# Patient Record
Sex: FEMALE | Race: WHITE
[De-identification: names, ages, dates, MRNs, and addresses within clinical notes are randomized per-mention and may not be internally consistent; named-entity substitution may affect disease eponyms.]

---

## 2019-07-23 ENCOUNTER — HOSPITAL ENCOUNTER (OUTPATIENT)
Dept: HOSPITAL 92 - TBSIIMAG | Age: 39
Discharge: HOME | End: 2019-07-23
Attending: NURSE PRACTITIONER
Payer: COMMERCIAL

## 2019-07-23 DIAGNOSIS — M48.02: ICD-10-CM

## 2019-07-23 DIAGNOSIS — M47.22: Primary | ICD-10-CM

## 2019-07-23 PROCEDURE — 72141 MRI NECK SPINE W/O DYE: CPT

## 2019-07-23 NOTE — MRI
MRI Cervical Spine WO Con



History: Shoulder pain. M54.12 radiculopathy of cervical region.



Comparison: None.



Findings: Cerebral tonsils terminate at the level of the foramen magnum. Cord signal is normal.



No marrow infiltrative process. No cervical adenopathy.



Levels are as follows:



C2/C3: Normal disc hydration. No neural foraminal or spinal canal narrowing.



C3/C4: Normal disc hydration. Low-grade uncinate process hypertrophy. Mild facet arthropathy. No neur
al foraminal or spinal canal narrowing.



C4/C5: Low-grade disc desiccation. Mild uncinate process hypertrophy. Mild facet arthropathy. Small l
eft paracentral posterior disc osteophyte complex. Minimal effacement of ventral CSF space. No

significant neural foraminal narrowing.



C5/C6: There is a broad-based posterior disc osteophyte complex greatest in the right paracentral zon
e. Moderate uncinate process hypertrophy. Mild facet arthropathy. There is effacement of ventral

CSF space without cord abutment anteriorly. Spinal canal measures approximately 9 mm. Moderate left a
nd mild right neural foraminal narrowing.



C6/C7: Circumferential disc osteophyte complex largest in the left lateral recess and subforaminal zo
ne. There is effacement of ventral CSF space with cord abutment anteriorly. Spinal canal measures

approximately 9 mm. Moderate to severe left and moderate right neural foraminal narrowing.



C7/T1: Right paracentral posterior disc osteophyte complex causing mild effacement of ventral CSF spa
ce. No significant neural foraminal narrowing.



Impression: Multilevel spondylosis as described with neural foraminal and spinal canal narrowing as w
ell as cord abutment. Cord signal is normal.



Reported By: Lenny Tejeda 

Electronically Signed:  7/23/2019 11:01 AM

## 2019-08-05 ENCOUNTER — HOSPITAL ENCOUNTER (OUTPATIENT)
Dept: HOSPITAL 92 - TBSIIMAG | Age: 39
Discharge: HOME | End: 2019-08-05
Attending: NURSE PRACTITIONER
Payer: COMMERCIAL

## 2019-08-05 DIAGNOSIS — M51.14: Primary | ICD-10-CM

## 2019-08-05 PROCEDURE — 72146 MRI CHEST SPINE W/O DYE: CPT

## 2019-08-05 NOTE — MRI
MRI OF THORACIC SPINE WITHOUT CONTRAST:

 

INDICATION: 

Thoracic radiculitis.  

 

FINDINGS: 

Thoracic vertebrae maintain height and alignment.  No compression deformity or edema is seen within a
ny thoracic vertebrae.  

 

Diffuse disk bulge at T2-3, T3-4, and T4-5 flatten the anterior thecal sac without cord impingement.

 

At T5-6, axial images show a small disk-osteophyte complex paracentrally on the right abutting the an
terior cord.

 

At T6-7, there is a focal disk protrusion paracentrally on the left flattening the anterior thecal sa
c and impinging on the anterior cord on the left producing slight flattening of the anterior cord at 
this level, best seen on axial T2 image.

 

At T7-T8, there is a broad-based disk protrusion which abuts and mildly flattens the anterior cord.  


 

At T8-T9, mild disk bulge flattens the thecal sac.

 

At T9-T10, there is a broad-based protrusion which abuts the anterior cord.

 

At T10-T11, diffuse disk bulge flattens the thecal sac and effaces the anterior subarachnoid space.  


 

At T11-T12, disk-osteophyte complex is seen to the right which encroaches into the right foramina and
 appears to impinge on the exiting right nerve root.

 

At T12-L1, no significant disk abnormality.

 

IMPRESSION: 

Disk bulge and disk protrusion at multiple levels of the thoracic spine as described above.

 

POS: [facility]